# Patient Record
Sex: MALE | Race: WHITE | NOT HISPANIC OR LATINO | Employment: FULL TIME | URBAN - METROPOLITAN AREA
[De-identification: names, ages, dates, MRNs, and addresses within clinical notes are randomized per-mention and may not be internally consistent; named-entity substitution may affect disease eponyms.]

---

## 2024-07-10 ENCOUNTER — TELEPHONE (OUTPATIENT)
Dept: FAMILY MEDICINE | Facility: CLINIC | Age: 44
End: 2024-07-10

## 2024-07-10 ENCOUNTER — OFFICE VISIT (OUTPATIENT)
Dept: FAMILY MEDICINE | Facility: CLINIC | Age: 44
End: 2024-07-10
Payer: COMMERCIAL

## 2024-07-10 VITALS
HEART RATE: 86 BPM | WEIGHT: 281 LBS | DIASTOLIC BLOOD PRESSURE: 91 MMHG | TEMPERATURE: 97.9 F | SYSTOLIC BLOOD PRESSURE: 156 MMHG | OXYGEN SATURATION: 97 % | RESPIRATION RATE: 18 BRPM

## 2024-07-10 DIAGNOSIS — R07.0 THROAT PAIN: Primary | ICD-10-CM

## 2024-07-10 LAB
DEPRECATED S PYO AG THROAT QL EIA: NEGATIVE
GROUP A STREP BY PCR: NOT DETECTED

## 2024-07-10 PROCEDURE — 99203 OFFICE O/P NEW LOW 30 MIN: CPT

## 2024-07-10 PROCEDURE — 87651 STREP A DNA AMP PROBE: CPT

## 2024-07-10 RX ORDER — ALBUTEROL SULFATE 90 UG/1
2 AEROSOL, METERED RESPIRATORY (INHALATION) EVERY 4 HOURS PRN
COMMUNITY
Start: 2024-02-29

## 2024-07-10 RX ORDER — BUDESONIDE AND FORMOTEROL FUMARATE DIHYDRATE 80; 4.5 UG/1; UG/1
2 AEROSOL RESPIRATORY (INHALATION)
COMMUNITY
Start: 2024-02-29 | End: 2025-02-28

## 2024-07-10 RX ORDER — LISINOPRIL/HYDROCHLOROTHIAZIDE 10-12.5 MG
1 TABLET ORAL DAILY
COMMUNITY

## 2024-07-10 RX ORDER — DESVENLAFAXINE 50 MG/1
50 TABLET, FILM COATED, EXTENDED RELEASE ORAL DAILY
COMMUNITY

## 2024-07-10 NOTE — PROGRESS NOTES
Assessment & Plan     - Base on assessments most likely a viral infections.  - Negative rapid strep test in clinic, will reflex to PCR and Result will be communicated with the patient by phone [phone] if positive and at time time treatment will be initiated.    - Recommended getting a COVID test 2 days if symptoms does not improve or worsen.  - Continue to take Ibuprofen and Tylenol for discomfort as needed.  Patient agree with the plan of care as we await strep PCR result.    Throat pain  - Base on the assessment  findings  - Streptococcus A Rapid Scr w Reflx to PCR  - Group A Streptococcus PCR Throat Swab      Return in about 1 week (around 7/17/2024), or if symptoms worsen or fail to improve.    Walt Fitch is a 43 year old, presenting for the following health issues:  Cough (Coughing up mucus, and painful coughing last night. Running nose ) and Pharyngitis (Pain in throat, headaches and body ache since yesterday.)    DARWIN Fitch is a a 43 years old male who present to the clinic with concerns for ongoing cold symptoms 4 days now, which have improve. Last night he had a productive cough with brownish sputum. When coughing he did experience some sharp middle chest pain that does not radiates anywhere else. Currently having generalized body aches and has not taken any medications so far. Had diarrhea 2 days ago, with the last episode being yesterday. Denies fever, bloody sputum, abdominal pain, or rashes.  Has a history of Bronchitis.   Has a history of Asthma and Pneumonia per the patient statement.      Review of Systems  Constitutional, HEENT, cardiovascular, pulmonary, gi and gu systems are negative, except as otherwise noted.      Objective    BP (!) 156/91 (BP Location: Right arm, Patient Position: Sitting, Cuff Size: Adult Large)   Pulse 86   Temp 97.9  F (36.6  C) (Tympanic)   Resp 18   Wt 127.5 kg (281 lb)   SpO2 97%   There is no height or weight on file to calculate  BMI.  Physical Exam   GENERAL: alert and no distress  HENT: normal cephalic/atraumatic, ear canals and TM's normal, nose and mouth without ulcers or lesions, mild erythema  on oropharynx with minimal tonsillar edema.  NECK: no adenopathy, no asymmetry, masses, or scars  RESP: lungs clear to auscultation - no rales, rhonchi or wheezes  CV: regular rate and rhythm, normal S1 S2, no S3 or S4, no murmur, click or rub, no peripheral edema        Results for orders placed or performed in visit on 07/10/24   Streptococcus A Rapid Scr w Reflx to PCR     Status: Normal    Specimen: Throat; Swab   Result Value Ref Range    Group A Strep antigen Negative Negative         Signed Electronically by: [facility]

## 2024-07-10 NOTE — TELEPHONE ENCOUNTER
Left message for patient that PCR strep is negative. Left message for patient to call back with questions.    Sheryl Rangel, CMA

## 2024-07-10 NOTE — TELEPHONE ENCOUNTER
----- Message from Lisa Murry sent at 7/10/2024  4:06 PM CDT -----  Please let him know his strep PCR was negative.

## 2025-04-06 ENCOUNTER — HEALTH MAINTENANCE LETTER (OUTPATIENT)
Age: 45
End: 2025-04-06